# Patient Record
Sex: MALE | Race: WHITE | NOT HISPANIC OR LATINO | URBAN - METROPOLITAN AREA
[De-identification: names, ages, dates, MRNs, and addresses within clinical notes are randomized per-mention and may not be internally consistent; named-entity substitution may affect disease eponyms.]

---

## 2020-04-29 ENCOUNTER — HOSPITAL ENCOUNTER (EMERGENCY)
Facility: HOSPITAL | Age: 25
Discharge: HOME | End: 2020-04-29
Attending: SURGERY
Payer: COMMERCIAL

## 2020-04-29 VITALS
WEIGHT: 295 LBS | SYSTOLIC BLOOD PRESSURE: 124 MMHG | HEART RATE: 82 BPM | RESPIRATION RATE: 16 BRPM | DIASTOLIC BLOOD PRESSURE: 80 MMHG | OXYGEN SATURATION: 99 % | TEMPERATURE: 98 F

## 2020-04-29 DIAGNOSIS — Z20.822 COVID-19 VIRUS NOT DETECTED: Primary | ICD-10-CM

## 2020-04-29 LAB
INFLUENZA A, MOLECULAR: NEGATIVE
INFLUENZA B, MOLECULAR: NEGATIVE
SARS-COV-2 RDRP RESP QL NAA+PROBE: NEGATIVE
SPECIMEN SOURCE: NORMAL

## 2020-04-29 PROCEDURE — 99283 EMERGENCY DEPT VISIT LOW MDM: CPT | Mod: 25

## 2020-04-29 PROCEDURE — U0002 COVID-19 LAB TEST NON-CDC: HCPCS

## 2020-04-29 PROCEDURE — 87502 INFLUENZA DNA AMP PROBE: CPT

## 2020-04-29 RX ORDER — ONDANSETRON 4 MG/1
4 TABLET, ORALLY DISINTEGRATING ORAL EVERY 8 HOURS PRN
Qty: 9 TABLET | Refills: 0 | Status: SHIPPED | OUTPATIENT
Start: 2020-04-29 | End: 2020-05-02

## 2020-04-29 NOTE — ED TRIAGE NOTES
Stated he developed fever.  Was seen and treated at a local urgent care facility.  Strep/flu swab was negative.  Came to [facility] because urgent care did not have covid-19 swabs.  No distress noted to pt.

## 2020-04-29 NOTE — ED PROVIDER NOTES
Encounter Date: 4/29/2020       History     Chief Complaint   Patient presents with    Fever     Cindy Arizmendi is a 24 y.o. male with no significant PMH who presents to the ED for COVID testing.  Patient reports that he has been seen by urgent care on 2 separate occasions for fever, T-max of 102° F at home.    He was told to come to the ED for COVID testing.  He reports fever, chills, body aches.    He denies nasal congestion, sore throat, or cough.  He denies chest wall pain or shortness of breath.  He denies abdominal pain; reports intermittent nausea.  Denies vomiting or diarrhea.  He reports that he has been taking Tylenol at home for fever.  He presents today specifically requesting testing for COVID-19.    The history is provided by the patient.     Review of patient's allergies indicates:  No Known Allergies  History reviewed. No pertinent past medical history.  History reviewed. No pertinent surgical history.  History reviewed. No pertinent family history.  Social History     Tobacco Use    Smoking status: Not on file   Substance Use Topics    Alcohol use: Not on file    Drug use: Not on file     Review of Systems   Constitutional: Positive for chills and fever. Negative for appetite change.   HENT: Negative.  Negative for congestion, ear discharge, ear pain, postnasal drip, rhinorrhea and sore throat.    Eyes: Negative.    Respiratory: Negative.  Negative for cough, chest tightness and shortness of breath.    Cardiovascular: Negative.  Negative for chest pain.   Gastrointestinal: Negative.  Negative for abdominal distention, abdominal pain and nausea.   Endocrine: Negative.    Genitourinary: Negative.  Negative for dysuria, flank pain, hematuria and urgency.   Musculoskeletal: Positive for myalgias. Negative for arthralgias and back pain.   Skin: Negative.  Negative for rash.   Allergic/Immunologic: Negative.    Neurological: Negative.  Negative for dizziness, weakness, numbness and headaches.    Hematological: Negative.  Does not bruise/bleed easily.   Psychiatric/Behavioral: Negative.        Physical Exam     Initial Vitals [04/29/20 1535]   BP Pulse Resp Temp SpO2   (!) 142/87 84 16 98.3 °F (36.8 °C) 98 %      MAP       --         Physical Exam    Nursing note and vitals reviewed.  Constitutional: He appears well-developed and well-nourished.   HENT:   Head: Normocephalic and atraumatic.   Right Ear: External ear normal.   Left Ear: External ear normal.   Nose: Nose normal.   Mouth/Throat: Oropharynx is clear and moist. No oropharyngeal exudate.   Eyes: Conjunctivae and EOM are normal. Pupils are equal, round, and reactive to light.   Neck: Normal range of motion. Neck supple.   Cardiovascular: Normal rate, regular rhythm, normal heart sounds and intact distal pulses.   Pulmonary/Chest: Effort normal and breath sounds normal. No tachypnea and no bradypnea. No respiratory distress. He has no decreased breath sounds. He has no wheezes. He has no rhonchi. He has no rales.   BBS CTA; no wheezing, rales or rhonchi   Abdominal: Soft. Bowel sounds are normal.   Musculoskeletal: Normal range of motion.   Neurological: He is alert and oriented to person, place, and time. He has normal strength. GCS score is 15. GCS eye subscore is 4. GCS verbal subscore is 5. GCS motor subscore is 6.   Skin: Skin is warm and dry. Capillary refill takes less than 2 seconds.   Psychiatric: He has a normal mood and affect. His behavior is normal. Judgment and thought content normal.         ED Course   Procedures  Labs Reviewed   INFLUENZA A & B BY MOLECULAR   SARS-COV-2 RNA AMPLIFICATION, QUAL    Narrative:     What symptom criteria does the patient meet?->Fever          Imaging Results          X-Ray Chest AP Portable (Final result)  Result time 04/29/20 16:31:17    Final result by Sharif Wyatt MD (04/29/20 16:31:17)                 Impression:      No acute findings.      Electronically signed by: Sharif Wyatt  MD  Date:    04/29/2020  Time:    16:31             Narrative:    EXAMINATION:  XR CHEST AP PORTABLE    CLINICAL HISTORY:  Cough and fever.,    COMPARISON:  None    FINDINGS:  Heart size is normal. The lung fields are clear. No acute cardiopulmonary infiltrate.                                                                 Clinical Impression:       ICD-10-CM ICD-9-CM   1. Covid-19 Virus not Detected [id number]          Disposition:   Disposition: Discharged  Condition: Stable     ED Disposition Condition    Discharge Stable        ED Prescriptions     Medication Sig Dispense Start Date End Date Auth. Provider    ondansetron (ZOFRAN-ODT) 4 MG TbDL Take 1 tablet (4 mg total) by mouth every 8 (eight) hours as needed (nausea). 9 tablet 4/29/2020 5/2/2020 Carmen Chow NP        Follow-up Information     Follow up With Specialties Details Why Contact Info    Bonnie Briceno MD Internal Medicine Go in 2 days  [address] [address]  [phone]                       Patient instructed on self isolation.  The patient acknowledges that close follow up with medical provider is required. Instructed to follow up with PCP within 2 days. Patient was given specific return precautions. The patient agrees to comply with all instruction and directions given in the ER.                  Carmen Chow NP  04/29/20 8700

## 2024-10-16 PROBLEM — E66.01 CLASS 3 SEVERE OBESITY DUE TO EXCESS CALORIES WITH SERIOUS COMORBIDITY AND BODY MASS INDEX (BMI) OF 45.0 TO 49.9 IN ADULT: Status: ACTIVE | Noted: 2024-10-16

## 2024-10-16 PROBLEM — R60.0 LOWER EXTREMITY EDEMA: Status: ACTIVE | Noted: 2024-10-16

## 2024-10-16 PROBLEM — Z76.89 ENCOUNTER TO ESTABLISH CARE: Status: ACTIVE | Noted: 2024-10-16

## 2024-10-16 PROBLEM — E66.813 CLASS 3 SEVERE OBESITY DUE TO EXCESS CALORIES WITH SERIOUS COMORBIDITY AND BODY MASS INDEX (BMI) OF 45.0 TO 49.9 IN ADULT: Status: ACTIVE | Noted: 2024-10-16

## 2024-10-16 PROBLEM — G47.33 OSA (OBSTRUCTIVE SLEEP APNEA): Status: ACTIVE | Noted: 2024-10-16

## 2024-10-28 PROBLEM — E78.5 HYPERLIPIDEMIA: Status: ACTIVE | Noted: 2024-10-28

## 2024-10-28 PROBLEM — R94.31 ABNORMAL EKG: Status: ACTIVE | Noted: 2024-10-28

## 2024-10-28 PROBLEM — R73.01 IFG (IMPAIRED FASTING GLUCOSE): Status: ACTIVE | Noted: 2024-10-28

## 2024-10-28 PROBLEM — Z00.00 ROUTINE GENERAL MEDICAL EXAMINATION AT A HEALTH CARE FACILITY: Status: ACTIVE | Noted: 2024-10-28

## 2025-02-06 ENCOUNTER — HOSPITAL ENCOUNTER (EMERGENCY)
Facility: HOSPITAL | Age: 30
Discharge: HOME | End: 2025-02-06
Attending: EMERGENCY MEDICINE
Payer: COMMERCIAL

## 2025-02-06 VITALS
HEART RATE: 99 BPM | OXYGEN SATURATION: 99 % | HEIGHT: 70 IN | BODY MASS INDEX: 45.1 KG/M2 | WEIGHT: 315 LBS | DIASTOLIC BLOOD PRESSURE: 89 MMHG | SYSTOLIC BLOOD PRESSURE: 151 MMHG | TEMPERATURE: 99 F | RESPIRATION RATE: 18 BRPM

## 2025-02-06 DIAGNOSIS — L03.116 CELLULITIS OF LEFT LEG: Primary | ICD-10-CM

## 2025-02-06 LAB
ALBUMIN SERPL BCP-MCNC: 4.1 G/DL (ref 3.5–5.2)
ALP SERPL-CCNC: 57 U/L (ref 55–135)
ALT SERPL W/O P-5'-P-CCNC: 45 U/L (ref 10–44)
ANION GAP SERPL CALC-SCNC: 9 MMOL/L (ref 8–16)
AST SERPL-CCNC: 30 U/L (ref 10–40)
BASOPHILS # BLD AUTO: 0.03 K/UL (ref 0–0.2)
BASOPHILS NFR BLD: 0.4 % (ref 0–1.9)
BILIRUB SERPL-MCNC: 0.9 MG/DL (ref 0.1–1)
BUN SERPL-MCNC: 11 MG/DL (ref 6–20)
CALCIUM SERPL-MCNC: 9.5 MG/DL (ref 8.7–10.5)
CHLORIDE SERPL-SCNC: 108 MMOL/L (ref 95–110)
CO2 SERPL-SCNC: 27 MMOL/L (ref 23–29)
CREAT SERPL-MCNC: 1.1 MG/DL (ref 0.5–1.4)
DIFFERENTIAL METHOD BLD: NORMAL
EOSINOPHIL # BLD AUTO: 0.1 K/UL (ref 0–0.5)
EOSINOPHIL NFR BLD: 1.1 % (ref 0–8)
ERYTHROCYTE [DISTWIDTH] IN BLOOD BY AUTOMATED COUNT: 13.2 % (ref 11.5–14.5)
EST. GFR  (NO RACE VARIABLE): >60 ML/MIN/1.73 M^2
GLUCOSE SERPL-MCNC: 89 MG/DL (ref 70–110)
HCT VFR BLD AUTO: 49 % (ref 40–54)
HGB BLD-MCNC: 16.5 G/DL (ref 14–18)
IMM GRANULOCYTES # BLD AUTO: 0.01 K/UL (ref 0–0.04)
IMM GRANULOCYTES NFR BLD AUTO: 0.1 % (ref 0–0.5)
LACTATE SERPL-SCNC: 0.7 MMOL/L (ref 0.5–2.2)
LYMPHOCYTES # BLD AUTO: 1.5 K/UL (ref 1–4.8)
LYMPHOCYTES NFR BLD: 20.7 % (ref 18–48)
MCH RBC QN AUTO: 28 PG (ref 27–31)
MCHC RBC AUTO-ENTMCNC: 33.7 G/DL (ref 32–36)
MCV RBC AUTO: 83 FL (ref 82–98)
MONOCYTES # BLD AUTO: 0.6 K/UL (ref 0.3–1)
MONOCYTES NFR BLD: 7.9 % (ref 4–15)
NEUTROPHILS # BLD AUTO: 5.1 K/UL (ref 1.8–7.7)
NEUTROPHILS NFR BLD: 69.8 % (ref 38–73)
NRBC BLD-RTO: 0 /100 WBC
PLATELET # BLD AUTO: 236 K/UL (ref 150–450)
PMV BLD AUTO: 9.7 FL (ref 9.2–12.9)
POTASSIUM SERPL-SCNC: 4.3 MMOL/L (ref 3.5–5.1)
PROT SERPL-MCNC: 7.4 G/DL (ref 6–8.4)
RBC # BLD AUTO: 5.9 M/UL (ref 4.6–6.2)
SODIUM SERPL-SCNC: 144 MMOL/L (ref 136–145)
WBC # BLD AUTO: 7.33 K/UL (ref 3.9–12.7)

## 2025-02-06 PROCEDURE — 25000003 PHARM REV CODE 250

## 2025-02-06 PROCEDURE — 80053 COMPREHEN METABOLIC PANEL: CPT

## 2025-02-06 PROCEDURE — 36415 COLL VENOUS BLD VENIPUNCTURE: CPT

## 2025-02-06 PROCEDURE — 85025 COMPLETE CBC W/AUTO DIFF WBC: CPT

## 2025-02-06 PROCEDURE — 83605 ASSAY OF LACTIC ACID: CPT

## 2025-02-06 PROCEDURE — 99284 EMERGENCY DEPT VISIT MOD MDM: CPT

## 2025-02-06 RX ORDER — SULFAMETHOXAZOLE AND TRIMETHOPRIM 800; 160 MG/1; MG/1
1 TABLET ORAL
Status: COMPLETED | OUTPATIENT
Start: 2025-02-06 | End: 2025-02-06

## 2025-02-06 RX ORDER — SULFAMETHOXAZOLE AND TRIMETHOPRIM 800; 160 MG/1; MG/1
1 TABLET ORAL 2 TIMES DAILY
Qty: 14 TABLET | Refills: 0 | Status: SHIPPED | OUTPATIENT
Start: 2025-02-06 | End: 2025-02-13

## 2025-02-06 RX ORDER — DICLOFENAC SODIUM 50 MG/1
50 TABLET, DELAYED RELEASE ORAL 3 TIMES DAILY PRN
Qty: 15 TABLET | Refills: 0 | Status: SHIPPED | OUTPATIENT
Start: 2025-02-06

## 2025-02-06 RX ADMIN — SULFAMETHOXAZOLE AND TRIMETHOPRIM 1 TABLET: 800; 160 TABLET ORAL at 02:02

## 2025-02-06 NOTE — ED PROVIDER NOTES
"Encounter Date: 2/6/2025       History     Chief Complaint   Patient presents with    Cellulitis     Was seen at [facility] urgent care in [place] today. Dx with cellulitis of left lower leg. Prescribed po keflex but states the provider told him "it wouldn't be a bad idea to go to the ER"    Onset yesterday     This note is dictated on M*Modal word recognition program.  There are word recognition mistakes and grammatical errors that are occasionally missed on review.     Cindy Arizmendi is a 29 y.o. male presents to ER today with complaints of cellulitis to medial aspect of left lower extremity just above ankle and below calf.  Patient denies leg swelling denies calf tenderness reports pain/erythema to area of cellulitis on left lower leg was seen by urgent care and [place] [facility] today.  Was prescribed Keflex did not start the Keflex net.  Patient reports urgent care provider told him being evaluated in an emergency department would not be a bad idea today.      The history is provided by the patient.     Review of patient's allergies indicates:  No Known Allergies  Past Medical History:   Diagnosis Date    Obesity, unspecified      Past Surgical History:   Procedure Laterality Date    WISDOM TOOTH EXTRACTION Bilateral      Family History   Problem Relation Name Age of Onset    Depression Mother      Other (gastric bypass) Mother      No Known Problems Father      Stroke Maternal Grandmother      Diabetes Maternal Grandmother      Diabetes Maternal Grandfather       Social History     Tobacco Use    Smoking status: Never    Smokeless tobacco: Never   Substance Use Topics    Alcohol use: Not Currently    Drug use: Yes     Types: Marijuana     Review of Systems   Skin:  Positive for color change and wound.   All other systems reviewed and are negative.      Physical Exam     Initial Vitals [02/06/25 1348]   BP Pulse Resp Temp SpO2   (!) 157/96 106 18 98.7 °F (37.1 °C) 98 %      MAP       --         Physical " Exam    Constitutional: He appears well-developed and well-nourished. He is not diaphoretic.   HENT:   Head: Normocephalic.   Eyes: Pupils are equal, round, and reactive to light. Right eye exhibits no discharge.   Neck: No thyromegaly present.   Normal range of motion.  Cardiovascular:  Normal rate and regular rhythm.           No murmur heard.  Pulmonary/Chest: Breath sounds normal. No respiratory distress.   Abdominal: Abdomen is soft. He exhibits no distension and no mass. There is no abdominal tenderness. There is no rebound.   Musculoskeletal:         General: No edema.      Cervical back: Normal range of motion.     Neurological: He is alert and oriented to person, place, and time. GCS score is 15. GCS eye subscore is 4. GCS verbal subscore is 5. GCS motor subscore is 6.   Skin: Capillary refill takes less than 2 seconds. Rash noted. There is erythema.   Patient has erythema/findings of left lower extremity consistent with cellulitis please see photographic documentation   Psychiatric: He has a normal mood and affect. His behavior is normal. Judgment and thought content normal.         ED Course   Procedures  Labs Reviewed   COMPREHENSIVE METABOLIC PANEL - Abnormal       Result Value    Sodium 144      Potassium 4.3      Chloride 108      CO2 27      Glucose 89      BUN 11      Creatinine 1.1      Calcium 9.5      Total Protein 7.4      Albumin 4.1      Total Bilirubin 0.9      Alkaline Phosphatase 57      AST 30      ALT 45 (*)     eGFR >60.0      Anion Gap 9     CBC W/ AUTO DIFFERENTIAL    WBC 7.33      RBC 5.90      Hemoglobin 16.5      Hematocrit 49.0      MCV 83      MCH 28.0      MCHC 33.7      RDW 13.2      Platelets 236      MPV 9.7      Immature Granulocytes 0.1      Gran # (ANC) 5.1      Immature Grans (Abs) 0.01      Lymph # 1.5      Mono # 0.6      Eos # 0.1      Baso # 0.03      nRBC 0      Gran % 69.8      Lymph % 20.7      Mono % 7.9      Eosinophil % 1.1      Basophil % 0.4      Differential  Method Automated     LACTIC ACID, PLASMA    Lactate (Lactic Acid) 0.7            Imaging Results    None          Medications   sulfamethoxazole-trimethoprim 800-160mg per tablet 1 tablet (1 tablet Oral Given 2/6/25 1437)     Medical Decision Making  Differential diagnosis includes cellulitis, rash, contact dermatitis    Physical findings consistent with cellulitis of left lower extremity place patient on Bactrim as well as Keflex to treat cellulitis of skin  CBC reveals no leukocytosis no anemia  CMP reveals no RO no metabolic derangement  No fever no signs of sepsis lactic acid within normal limits  Left lower extremity neurovascularly intact on examination  Patient stable at time of discharge in no acute distress.  No life-threatening illnesses were found during ER visit today.  Patient was instructed to follow-up with PCP or other recommended specialist within the next 48-72 hours.  Patient was instructed to return to ER immediately for any worsening or concerning symptoms.  All discharge instructions discussed with patient, and patient agrees to comply with discharge instructions given today.     Amount and/or Complexity of Data Reviewed  Labs: ordered.    Risk  Prescription drug management.                                      Clinical Impression:  Final diagnoses:  [L03.116] Cellulitis of left leg (Primary)          ED Disposition Condition    Discharge Stable          ED Prescriptions       Medication Sig Dispense Start Date End Date Auth. Provider    sulfamethoxazole-trimethoprim 800-160mg (BACTRIM DS) 800-160 mg Tab Take 1 tablet by mouth 2 (two) times daily. for 7 days 14 tablet 2/6/2025 2/13/2025 Girish Fay NP    diclofenac (VOLTAREN) 50 MG EC tablet Take 1 tablet (50 mg total) by mouth 3 (three) times daily as needed (Pain). 15 tablet 2/6/2025 -- Girish Fay NP          Follow-up Information       Follow up With Specialties Details Why Contact Info    Sharif Manuel Jr., MD Internal Medicine    [address] [facility] [address]  [phone]               Girish Fay, NP  02/06/25 9252